# Patient Record
(demographics unavailable — no encounter records)

---

## 2024-10-09 NOTE — ASSESSMENT
[FreeTextEntry1] : Marquise Rocha is a 34 y/o M 6 weeks s/p VSG who presents today for a follow up. BMI today is 32 and almost 50 lbs since surgery. Patient is doing well and denies n/v/c/d/ SOB, acid reflux, po intolerance, chest pain, abd pain, dizziness, fever and calf pain. Patient states that he often feels hungry and we encourage eating high protein/fiber meals to have satiety for a longer period of time. He further mentions nocturnal reflux and we advise avoiding having late night meals. No other concerns at this time. Will have patient meet the nutritionist today and follow up in one month.

## 2024-10-09 NOTE — HISTORY OF PRESENT ILLNESS
[de-identified] : Marquise Rocha is a 36 y/o M 6 weeks s/p VSG who presents today for a follow up. BMI today is 32 and almost 50 lbs since surgery. Patient is doing well and denies n/v/c/d/ SOB, acid reflux, po intolerance, chest pain, abd pain, dizziness, fever and calf pain. Patient states that he often feels hungry and we encourage eating high protein/fiber meals to have satiety for a longer period of time. He further mentions nocturnal reflux and we advise avoiding having late night meals. No other concerns at this time. Will have patient meet the nutritionist today and follow up in one month.

## 2024-10-09 NOTE — END OF VISIT
[FreeTextEntry3] :  All medical record entries made by the Scribe were at my, CLARENCE Krishnan , direction and personally dictated by me on 10/09/2024 . I have reviewed the chart and agree that the record accurately reflects my personal performance of the history, physical exam, assessment and plan. I have also personally directed, reviewed, and agreed with the chart.  [Time Spent: ___ minutes] : I have spent [unfilled] minutes of time on the encounter which excludes teaching and separately reported services.

## 2024-10-09 NOTE — ADDENDUM
[FreeTextEntry1] :  Documented by Jessie Long acting as a scribe for CLARENCE Pedraza  on 10/09/2024  .

## 2024-11-13 NOTE — HISTORY OF PRESENT ILLNESS
[de-identified] : Marquise Rocha is a 36 y/o M 3 months s/p sleeve gastrectomy who presents today for a follow up. BMI today is 29 and patient has lost 60 lbs since surgery. Patient is doing well and denies n/v/c/d abd pain and acid reflux. Patient reports he has recent onset of pruritic rash on the left shoulder and on physical examination, there is presence of local dermatitis with excoriation marks. Patient is concerned dermatitis is a reaction to surgery and we assured him that that it highly unlikely. Patient also admits to smoking cigarretes and reports difficulty with cessation. Advised patient to meet a smoking cessation specialist for further assessment. No other concerns at this time. Will have patient meet the nutritionist today and obtain bloodwork. Follow up in 3 months.

## 2024-11-13 NOTE — PLAN
[FreeTextEntry1] : Will have patient meet the nutritionist today and obtain bloodwork. Follow up in 3 months.

## 2024-11-13 NOTE — ASSESSMENT
[FreeTextEntry1] : Marquise Rocha is a 36 y/o M 3 months s/p sleeve gastrectomy who presents today for a follow up. BMI today is 29 and patient has lost 60 lbs since surgery. Patient is doing well and denies n/v/c/d abd pain and acid reflux. Patient reports he has recent onset of pruritic rash on the left shoulder and on physical examination, there is presence of local dermatitis with excoriation marks. Patient is concerned dermatitis is a reaction to surgery and we assured him that that it highly unlikely. Patient also admits to smoking cigarretes and reports difficulty with cessation. Advised patient to meet a smoking cessation specialist for further assessment. No other concerns at this time. Will have patient meet the nutritionist today and obtain bloodwork. Follow up in 3 months.

## 2025-04-23 NOTE — PHYSICAL EXAM
[Normal Appearance] : normal appearance [Well Groomed] : well groomed [Edema] : no peripheral edema [] : no respiratory distress [Abdomen Soft] : soft [Costovertebral Angle Tenderness] : no ~M costovertebral angle tenderness [Urinary Bladder Findings] : the bladder was normal on palpation [Normal Station and Gait] : the gait and station were normal for the patient's age [Skin Color & Pigmentation] : normal skin color and pigmentation [Affect] : the affect was normal [Mood] : the mood was normal [Abdomen Mass (___ Cm)] : no abdominal mass palpated [Abdomen Hernia] : no hernia was discovered [Penis Abnormality] : normal circumcised penis [Scrotum] : the scrotum was normal [Epididymis] : the epididymides were normal [Testes Tenderness] : no tenderness of the testes [Testes Mass (___cm)] : there were no testicular masses

## 2025-07-02 NOTE — HISTORY OF PRESENT ILLNESS
[de-identified] : Marquise Rocha is a 36-year-old male, approximately 1 year s/p sleeve presents here for a follow-up visit. Patient presents today with ongoing complaints, primarily continued weight loss. They have lost 128 lbs over the past 10 months. Current diet includes vegetables, chicken, and coffee. They report daily small bowel movements but feel they are not getting enough fiber in their diet. No clear mechanical signs of obstruction were observed. Counseled the patient on the continued importance of adhering to a high-protein, high-fiber diet and maintaining a regular exercise routine to support ongoing weight loss and overall health. Reinforced the importance of maintaining vitamin regimen. 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 Patient is advised to undergo endoscopy every 5 years for monitoring. Advise increasing strength training exercises to build muscle mass. Recommended protein intake is  grams per day. Will follow up with a dietitian for further nutritional planning. Will order blood work to check sex hormone status. Will follow up when results are available.

## 2025-07-02 NOTE — ADDENDUM
[FreeTextEntry1] :   Documented by Glo Mcnally acting as a scribe for Dr. Ru Hurtado  on 07/02/2025  .

## 2025-07-02 NOTE — END OF VISIT
[FreeTextEntry3] : All medical record entries made by the Scribe were at my, Dr. Ru Hurtado , direction and personally dictated by me on 07/02/2025 . I have reviewed the chart and agree that the record accurately reflects my personal performance of the history, physical exam, assessment and plan. I have also personally directed, reviewed, and agreed with the chart.  [Time Spent: ___ minutes] : I have spent [unfilled] minutes of time on the encounter which excludes teaching and separately reported services.

## 2025-07-02 NOTE — ASSESSMENT
[FreeTextEntry1] : Marquise Rocha is a 36-year-old male, approximately 1 year s/p sleeve presents here for a follow-up visit. Patient presents today with ongoing complaints, primarily continued weight loss. They have lost 128 lbs over the past 10 months. Current diet includes vegetables, chicken, and coffee. They report daily small bowel movements but feel they are not getting enough fiber in their diet. No clear mechanical signs of obstruction were observed. Counseled the patient on the continued importance of adhering to a high-protein, high-fiber diet and maintaining a regular exercise routine to support ongoing weight loss and overall health. Reinforced the importance of maintaining vitamin regimen. 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 Patient is advised to undergo endoscopy every 5 years for monitoring. Advise increasing strength training exercises to build muscle mass. Recommended protein intake is  grams per day. Will follow up with a dietitian for further nutritional planning. Will order blood work to check sex hormone status. Will follow up when results are available.

## 2025-07-02 NOTE — PLAN
[FreeTextEntry1] : Will follow up with a dietitian for further nutritional planning. Will order blood work to check sex hormone status. Will follow up when results are available.